# Patient Record
Sex: MALE | Race: AMERICAN INDIAN OR ALASKA NATIVE | Employment: UNEMPLOYED | URBAN - METROPOLITAN AREA
[De-identification: names, ages, dates, MRNs, and addresses within clinical notes are randomized per-mention and may not be internally consistent; named-entity substitution may affect disease eponyms.]

---

## 2018-05-10 ENCOUNTER — OFFICE VISIT (OUTPATIENT)
Dept: FAMILY MEDICINE CLINIC | Age: 68
End: 2018-05-10
Payer: MEDICARE

## 2018-05-10 VITALS
TEMPERATURE: 97.2 F | OXYGEN SATURATION: 97 % | DIASTOLIC BLOOD PRESSURE: 60 MMHG | BODY MASS INDEX: 39.38 KG/M2 | WEIGHT: 214 LBS | HEART RATE: 73 BPM | SYSTOLIC BLOOD PRESSURE: 120 MMHG | RESPIRATION RATE: 12 BRPM | HEIGHT: 62 IN

## 2018-05-10 DIAGNOSIS — L23.7 CONTACT DERMATITIS DUE TO POISON SUMAC: Primary | ICD-10-CM

## 2018-05-10 PROCEDURE — 3017F COLORECTAL CA SCREEN DOC REV: CPT | Performed by: NURSE PRACTITIONER

## 2018-05-10 PROCEDURE — 99213 OFFICE O/P EST LOW 20 MIN: CPT | Performed by: NURSE PRACTITIONER

## 2018-05-10 PROCEDURE — G8417 CALC BMI ABV UP PARAM F/U: HCPCS | Performed by: NURSE PRACTITIONER

## 2018-05-10 PROCEDURE — G8427 DOCREV CUR MEDS BY ELIG CLIN: HCPCS | Performed by: NURSE PRACTITIONER

## 2018-05-10 PROCEDURE — 1123F ACP DISCUSS/DSCN MKR DOCD: CPT | Performed by: NURSE PRACTITIONER

## 2018-05-10 PROCEDURE — 4040F PNEUMOC VAC/ADMIN/RCVD: CPT | Performed by: NURSE PRACTITIONER

## 2018-05-10 PROCEDURE — 1036F TOBACCO NON-USER: CPT | Performed by: NURSE PRACTITIONER

## 2018-05-10 RX ORDER — BETAMETHASONE DIPROPIONATE 0.05 %
OINTMENT (GRAM) TOPICAL
Qty: 1 TUBE | Refills: 0 | Status: SHIPPED | OUTPATIENT
Start: 2018-05-10 | End: 2018-05-16 | Stop reason: ALTCHOICE

## 2018-05-10 RX ORDER — TRIAMCINOLONE ACETONIDE 40 MG/ML
40 INJECTION, SUSPENSION INTRA-ARTICULAR; INTRAMUSCULAR ONCE
Status: COMPLETED | OUTPATIENT
Start: 2018-05-10 | End: 2018-05-10

## 2018-05-10 RX ORDER — TRAMADOL HYDROCHLORIDE 50 MG/1
TABLET ORAL
Refills: 0 | COMMUNITY
Start: 2018-01-31

## 2018-05-10 RX ADMIN — TRIAMCINOLONE ACETONIDE 40 MG: 40 INJECTION, SUSPENSION INTRA-ARTICULAR; INTRAMUSCULAR at 14:31

## 2018-05-10 ASSESSMENT — ENCOUNTER SYMPTOMS
CHEST TIGHTNESS: 0
CONSTIPATION: 0
EYE DISCHARGE: 0
ABDOMINAL PAIN: 0
SHORTNESS OF BREATH: 0
COUGH: 0
DIARRHEA: 0
VOMITING: 0
WHEEZING: 0
SORE THROAT: 0
NAUSEA: 0
RHINORRHEA: 0

## 2018-05-10 ASSESSMENT — PATIENT HEALTH QUESTIONNAIRE - PHQ9
SUM OF ALL RESPONSES TO PHQ9 QUESTIONS 1 & 2: 0
SUM OF ALL RESPONSES TO PHQ QUESTIONS 1-9: 0
1. LITTLE INTEREST OR PLEASURE IN DOING THINGS: 0
2. FEELING DOWN, DEPRESSED OR HOPELESS: 0

## 2018-05-13 DIAGNOSIS — L23.7 CONTACT DERMATITIS DUE TO POISON SUMAC: ICD-10-CM

## 2018-05-13 RX ORDER — BETAMETHASONE DIPROPIONATE 0.5 MG/G
CREAM TOPICAL
Qty: 1 TUBE | Refills: 0 | Status: SHIPPED | OUTPATIENT
Start: 2018-05-13 | End: 2018-05-16 | Stop reason: ALTCHOICE

## 2018-05-16 ENCOUNTER — OFFICE VISIT (OUTPATIENT)
Dept: FAMILY MEDICINE CLINIC | Age: 68
End: 2018-05-16
Payer: MEDICARE

## 2018-05-16 VITALS
RESPIRATION RATE: 12 BRPM | OXYGEN SATURATION: 92 % | WEIGHT: 212.38 LBS | HEART RATE: 65 BPM | TEMPERATURE: 97.7 F | BODY MASS INDEX: 39.08 KG/M2 | DIASTOLIC BLOOD PRESSURE: 80 MMHG | SYSTOLIC BLOOD PRESSURE: 138 MMHG | HEIGHT: 62 IN

## 2018-05-16 DIAGNOSIS — L23.7 CONTACT DERMATITIS DUE TO POISON SUMAC: Primary | ICD-10-CM

## 2018-05-16 DIAGNOSIS — L23.7 POISON SUMAC: ICD-10-CM

## 2018-05-16 PROBLEM — E78.5 HYPERLIPIDEMIA: Status: ACTIVE | Noted: 2018-05-16

## 2018-05-16 PROCEDURE — 3017F COLORECTAL CA SCREEN DOC REV: CPT | Performed by: NURSE PRACTITIONER

## 2018-05-16 PROCEDURE — 4040F PNEUMOC VAC/ADMIN/RCVD: CPT | Performed by: NURSE PRACTITIONER

## 2018-05-16 PROCEDURE — 1123F ACP DISCUSS/DSCN MKR DOCD: CPT | Performed by: NURSE PRACTITIONER

## 2018-05-16 PROCEDURE — 1036F TOBACCO NON-USER: CPT | Performed by: NURSE PRACTITIONER

## 2018-05-16 PROCEDURE — G8417 CALC BMI ABV UP PARAM F/U: HCPCS | Performed by: NURSE PRACTITIONER

## 2018-05-16 PROCEDURE — 99213 OFFICE O/P EST LOW 20 MIN: CPT | Performed by: NURSE PRACTITIONER

## 2018-05-16 PROCEDURE — G8427 DOCREV CUR MEDS BY ELIG CLIN: HCPCS | Performed by: NURSE PRACTITIONER

## 2018-05-16 RX ORDER — TRIAMCINOLONE ACETONIDE 40 MG/ML
40 INJECTION, SUSPENSION INTRA-ARTICULAR; INTRAMUSCULAR ONCE
Status: COMPLETED | OUTPATIENT
Start: 2018-05-16 | End: 2018-05-16

## 2018-05-16 RX ORDER — BETAMETHASONE DIPROPIONATE 0.05 %
OINTMENT (GRAM) TOPICAL
Qty: 1 TUBE | Refills: 0 | Status: SHIPPED | OUTPATIENT
Start: 2018-05-16 | End: 2018-06-07 | Stop reason: SDUPTHER

## 2018-05-16 RX ADMIN — TRIAMCINOLONE ACETONIDE 40 MG: 40 INJECTION, SUSPENSION INTRA-ARTICULAR; INTRAMUSCULAR at 19:58

## 2018-05-16 ASSESSMENT — ENCOUNTER SYMPTOMS
RHINORRHEA: 0
TROUBLE SWALLOWING: 0
CHEST TIGHTNESS: 0
NAUSEA: 0
COUGH: 0
SHORTNESS OF BREATH: 0
VOMITING: 0
CONSTIPATION: 0
EYE ITCHING: 0
ABDOMINAL PAIN: 0
EYE DISCHARGE: 0
STRIDOR: 0
DIARRHEA: 0
WHEEZING: 0

## 2018-06-07 ENCOUNTER — OFFICE VISIT (OUTPATIENT)
Dept: FAMILY MEDICINE CLINIC | Age: 68
End: 2018-06-07
Payer: MEDICARE

## 2018-06-07 VITALS
RESPIRATION RATE: 12 BRPM | SYSTOLIC BLOOD PRESSURE: 130 MMHG | HEIGHT: 62 IN | OXYGEN SATURATION: 97 % | WEIGHT: 207 LBS | TEMPERATURE: 97.8 F | DIASTOLIC BLOOD PRESSURE: 72 MMHG | BODY MASS INDEX: 38.09 KG/M2 | HEART RATE: 77 BPM

## 2018-06-07 DIAGNOSIS — L23.7 CONTACT DERMATITIS DUE TO POISON SUMAC: ICD-10-CM

## 2018-06-07 PROCEDURE — 3017F COLORECTAL CA SCREEN DOC REV: CPT | Performed by: NURSE PRACTITIONER

## 2018-06-07 PROCEDURE — 1123F ACP DISCUSS/DSCN MKR DOCD: CPT | Performed by: NURSE PRACTITIONER

## 2018-06-07 PROCEDURE — G8417 CALC BMI ABV UP PARAM F/U: HCPCS | Performed by: NURSE PRACTITIONER

## 2018-06-07 PROCEDURE — 4040F PNEUMOC VAC/ADMIN/RCVD: CPT | Performed by: NURSE PRACTITIONER

## 2018-06-07 PROCEDURE — G8427 DOCREV CUR MEDS BY ELIG CLIN: HCPCS | Performed by: NURSE PRACTITIONER

## 2018-06-07 PROCEDURE — 99213 OFFICE O/P EST LOW 20 MIN: CPT | Performed by: NURSE PRACTITIONER

## 2018-06-07 PROCEDURE — 1036F TOBACCO NON-USER: CPT | Performed by: NURSE PRACTITIONER

## 2018-06-07 RX ORDER — BETAMETHASONE DIPROPIONATE 0.05 %
OINTMENT (GRAM) TOPICAL
Qty: 1 TUBE | Refills: 0 | Status: SHIPPED | OUTPATIENT
Start: 2018-06-07

## 2018-06-07 RX ORDER — METHYLPREDNISOLONE 4 MG/1
TABLET ORAL
Qty: 1 KIT | Refills: 0 | Status: SHIPPED | OUTPATIENT
Start: 2018-06-07

## 2018-06-07 ASSESSMENT — ENCOUNTER SYMPTOMS
STRIDOR: 0
VOMITING: 0
RHINORRHEA: 0
EYE DISCHARGE: 0
DIARRHEA: 0
ABDOMINAL PAIN: 0
COUGH: 0
CONSTIPATION: 0
SORE THROAT: 0
TROUBLE SWALLOWING: 0
EYE ITCHING: 0
SHORTNESS OF BREATH: 0
NAUSEA: 0
WHEEZING: 0
CHEST TIGHTNESS: 0

## 2023-08-26 PROBLEM — Z89.512: Status: ACTIVE | Noted: 2023-08-26

## 2023-08-26 PROBLEM — G47.33 OBSTRUCTIVE SLEEP APNEA: Status: ACTIVE | Noted: 2023-08-26

## 2023-08-26 PROBLEM — I25.10 CORONARY ARTERY DISEASE WITHOUT ANGINA PECTORIS: Status: ACTIVE | Noted: 2023-08-26

## 2023-08-26 PROBLEM — E66.9 OBESITY: Status: ACTIVE | Noted: 2023-08-26

## 2023-08-26 PROBLEM — R06.02 SHORTNESS OF BREATH: Status: ACTIVE | Noted: 2023-08-26

## 2023-08-26 PROBLEM — I20.9 ANGINA PECTORIS (CMS-HCC): Status: ACTIVE | Noted: 2023-08-26

## 2023-08-26 PROBLEM — M54.2 CERVICAL PAIN: Status: ACTIVE | Noted: 2023-08-26

## 2023-08-26 PROBLEM — R13.10 DYSPHAGIA: Status: ACTIVE | Noted: 2023-08-26

## 2023-08-26 PROBLEM — I10 BENIGN ESSENTIAL HYPERTENSION: Status: ACTIVE | Noted: 2023-08-26

## 2023-08-26 PROBLEM — M54.50 LOW BACK PAIN: Status: ACTIVE | Noted: 2023-08-26

## 2023-08-26 PROBLEM — R53.83 FATIGUE: Status: ACTIVE | Noted: 2023-08-26

## 2023-08-26 PROBLEM — E78.2 MIXED HYPERLIPIDEMIA: Status: ACTIVE | Noted: 2023-08-26

## 2023-08-26 PROBLEM — M47.12 CERVICAL SPONDYLOSIS WITH MYELOPATHY: Status: ACTIVE | Noted: 2023-08-26

## 2023-08-26 PROBLEM — R07.9 CHEST PAIN: Status: ACTIVE | Noted: 2023-08-26

## 2023-08-26 RX ORDER — NITROGLYCERIN 0.4 MG/1
0.4 TABLET SUBLINGUAL
COMMUNITY
Start: 2017-10-02

## 2023-08-26 RX ORDER — LISINOPRIL 10 MG/1
10 TABLET ORAL 2 TIMES DAILY
COMMUNITY
Start: 2017-08-16 | End: 2023-11-29 | Stop reason: ALTCHOICE

## 2023-08-26 RX ORDER — TRAMADOL HYDROCHLORIDE 50 MG/1
50 TABLET ORAL EVERY 8 HOURS PRN
COMMUNITY
Start: 2018-01-31

## 2023-08-26 RX ORDER — BISOPROLOL FUMARATE AND HYDROCHLOROTHIAZIDE 10; 6.25 MG/1; MG/1
1 TABLET ORAL DAILY
COMMUNITY
Start: 2018-09-24

## 2023-08-26 RX ORDER — HYDROGEN PEROXIDE 3 %
20 SOLUTION, NON-ORAL MISCELLANEOUS DAILY
COMMUNITY
End: 2023-11-29 | Stop reason: ALTCHOICE

## 2023-08-26 RX ORDER — ACETAMINOPHEN 500 MG
500 TABLET ORAL EVERY 6 HOURS PRN
COMMUNITY
End: 2023-11-29 | Stop reason: ALTCHOICE

## 2023-08-26 RX ORDER — ASPIRIN 81 MG/1
81 TABLET ORAL DAILY
COMMUNITY
Start: 2018-09-24

## 2023-08-26 RX ORDER — TAMSULOSIN HYDROCHLORIDE 0.4 MG/1
0.4 CAPSULE ORAL DAILY
COMMUNITY
Start: 2018-08-24

## 2023-08-26 RX ORDER — ATORVASTATIN CALCIUM 40 MG/1
1 TABLET, FILM COATED ORAL NIGHTLY
COMMUNITY
Start: 2017-09-21 | End: 2023-11-27

## 2023-08-26 RX ORDER — HYDROXYZINE HYDROCHLORIDE 25 MG/1
25 TABLET, FILM COATED ORAL
COMMUNITY
Start: 2021-08-03

## 2023-08-26 RX ORDER — BUDESONIDE 180 UG/1
AEROSOL, POWDER RESPIRATORY (INHALATION)
COMMUNITY
Start: 2018-08-24 | End: 2023-11-29 | Stop reason: ALTCHOICE

## 2023-10-04 ENCOUNTER — APPOINTMENT (OUTPATIENT)
Dept: CARDIOLOGY | Facility: CLINIC | Age: 73
End: 2023-10-04
Payer: MEDICARE

## 2023-11-23 DIAGNOSIS — E78.2 MIXED HYPERLIPIDEMIA: ICD-10-CM

## 2023-11-27 RX ORDER — ATORVASTATIN CALCIUM 40 MG/1
40 TABLET, FILM COATED ORAL NIGHTLY
Qty: 90 TABLET | Refills: 3 | Status: SHIPPED | OUTPATIENT
Start: 2023-11-27

## 2023-11-29 ENCOUNTER — OFFICE VISIT (OUTPATIENT)
Dept: CARDIOLOGY | Facility: CLINIC | Age: 73
End: 2023-11-29
Payer: MEDICARE

## 2023-11-29 ENCOUNTER — LAB (OUTPATIENT)
Dept: LAB | Facility: LAB | Age: 73
End: 2023-11-29
Payer: MEDICARE

## 2023-11-29 VITALS
WEIGHT: 202.5 LBS | DIASTOLIC BLOOD PRESSURE: 78 MMHG | BODY MASS INDEX: 37.26 KG/M2 | HEART RATE: 57 BPM | HEIGHT: 62 IN | SYSTOLIC BLOOD PRESSURE: 128 MMHG

## 2023-11-29 DIAGNOSIS — I87.2 CHRONIC VENOUS INSUFFICIENCY OF LOWER EXTREMITY: ICD-10-CM

## 2023-11-29 DIAGNOSIS — Z87.891 FORMER SMOKER: ICD-10-CM

## 2023-11-29 DIAGNOSIS — G47.33 OBSTRUCTIVE SLEEP APNEA: ICD-10-CM

## 2023-11-29 DIAGNOSIS — I25.10 CORONARY ARTERY DISEASE WITHOUT ANGINA PECTORIS, UNSPECIFIED VESSEL OR LESION TYPE, UNSPECIFIED WHETHER NATIVE OR TRANSPLANTED HEART: ICD-10-CM

## 2023-11-29 DIAGNOSIS — E78.2 MIXED HYPERLIPIDEMIA: ICD-10-CM

## 2023-11-29 DIAGNOSIS — I10 BENIGN ESSENTIAL HYPERTENSION: ICD-10-CM

## 2023-11-29 LAB
ALBUMIN SERPL BCP-MCNC: 4 G/DL (ref 3.4–5)
ALP SERPL-CCNC: 79 U/L (ref 33–136)
ALT SERPL W P-5'-P-CCNC: 33 U/L (ref 10–52)
ANION GAP SERPL CALC-SCNC: 9 MMOL/L (ref 10–20)
AST SERPL W P-5'-P-CCNC: 29 U/L (ref 9–39)
BILIRUB SERPL-MCNC: 0.9 MG/DL (ref 0–1.2)
BUN SERPL-MCNC: 16 MG/DL (ref 6–23)
CALCIUM SERPL-MCNC: 9 MG/DL (ref 8.6–10.3)
CHLORIDE SERPL-SCNC: 97 MMOL/L (ref 98–107)
CHOLEST SERPL-MCNC: 121 MG/DL (ref 0–199)
CHOLESTEROL/HDL RATIO: 3.2
CO2 SERPL-SCNC: 33 MMOL/L (ref 21–32)
CREAT SERPL-MCNC: 0.72 MG/DL (ref 0.5–1.3)
GFR SERPL CREATININE-BSD FRML MDRD: >90 ML/MIN/1.73M*2
GLUCOSE SERPL-MCNC: 100 MG/DL (ref 74–99)
HDLC SERPL-MCNC: 37.8 MG/DL
LDLC SERPL CALC-MCNC: 60 MG/DL
NON HDL CHOLESTEROL: 83 MG/DL (ref 0–149)
POTASSIUM SERPL-SCNC: 4.5 MMOL/L (ref 3.5–5.3)
PROT SERPL-MCNC: 6.1 G/DL (ref 6.4–8.2)
SODIUM SERPL-SCNC: 134 MMOL/L (ref 136–145)
TRIGL SERPL-MCNC: 117 MG/DL (ref 0–149)
VLDL: 23 MG/DL (ref 0–40)

## 2023-11-29 PROCEDURE — 80061 LIPID PANEL: CPT

## 2023-11-29 PROCEDURE — 80053 COMPREHEN METABOLIC PANEL: CPT

## 2023-11-29 PROCEDURE — 3078F DIAST BP <80 MM HG: CPT | Performed by: INTERNAL MEDICINE

## 2023-11-29 PROCEDURE — 3008F BODY MASS INDEX DOCD: CPT | Performed by: INTERNAL MEDICINE

## 2023-11-29 PROCEDURE — 1159F MED LIST DOCD IN RCRD: CPT | Performed by: INTERNAL MEDICINE

## 2023-11-29 PROCEDURE — 36415 COLL VENOUS BLD VENIPUNCTURE: CPT

## 2023-11-29 PROCEDURE — 3074F SYST BP LT 130 MM HG: CPT | Performed by: INTERNAL MEDICINE

## 2023-11-29 PROCEDURE — 99214 OFFICE O/P EST MOD 30 MIN: CPT | Performed by: INTERNAL MEDICINE

## 2023-11-29 PROCEDURE — 1036F TOBACCO NON-USER: CPT | Performed by: INTERNAL MEDICINE

## 2023-11-29 RX ORDER — LISINOPRIL 20 MG/1
20 TABLET ORAL 2 TIMES DAILY
COMMUNITY
End: 2024-03-25 | Stop reason: SDUPTHER

## 2023-11-29 NOTE — PATIENT INSTRUCTIONS
Follow up in 1 year  Continue same medications and treatments.   Patient educated on proper medication use.   Patient educated on risk factor modification.   Please bring any lab results from other providers / physicians to your next appointment.     Please bring all medicines, vitamins, and herbal supplements with you when you come to the office.     Prescriptions will not be filled unless you are compliant with your follow up appointments or have a follow up appointment scheduled as per instruction of your physician. Refills should be requested at the time of your visit.     Lalita BARON LPN, am scribing for and in the presence of Dr. Naveed Lanier

## 2023-11-29 NOTE — PROGRESS NOTES
CARDIOLOGY OFFICE VISIT      CHIEF COMPLAINT      HISTORY OF PRESENT ILLNESS  The patient states that he has been doing well from a cardiac standpoint.  He denies chest discomfort or symptoms of myocardial ischemia.  He has not used nitroglycerin.  He denies any significant dyspnea.  He denies palpitation and syncope.  He denies any problem with his current cardiac medications.  He has not had lab work done for some time.  I told him we will get lab work done today and call him with results.      IMPRESSION:   1. Coronary artery disease  2. Essential hypertension.  3. Mixed hyperlipidemia.  4. Former smoker.  5. Obesity.   6. Remote left below-the-knee amputation.  7. History of obstructive sleep apnea.  8. Chronic venous insufficiency of right lower extremity.     Please excuse any errors in grammar or translation related to this dictation. Voice recognition software was utilized to prepare this document.       Past Medical History  Past Medical History:   Diagnosis Date    Anesthesia of skin 10/04/2021    Numbness and tingling    Personal history of other diseases of the circulatory system 10/04/2021    History of hypertension    Personal history of other diseases of the musculoskeletal system and connective tissue 10/04/2021    History of arthritis    Personal history of other endocrine, nutritional and metabolic disease     History of high cholesterol    Personal history of other mental and behavioral disorders     History of anxiety    Unspecified visual loss 10/04/2021    Vision problems       Social History  Social History     Tobacco Use    Smoking status: Not on file    Smokeless tobacco: Not on file   Substance Use Topics    Alcohol use: Not on file    Drug use: Not on file       Family History     Family History   Problem Relation Name Age of Onset    Bilateral breast cancer Mother      Other (biliary tract cancer [Other]) Mother      Other (Coronary artery stent placement) Mother      Other (cabg) Father       Other (cardiac disorder) Father      Skin cancer Father          Allergies:  No Known Allergies     Outpatient Medications:  Current Outpatient Medications   Medication Instructions    acetaminophen (TYLENOL) 500 mg, oral, Every 6 hours PRN    aspirin 81 mg, oral, Daily    atorvastatin (LIPITOR) 40 mg, oral, Nightly    bisoproloL-hydrochlorothiazide (Ziac) 10-6.25 mg tablet 1 tablet, oral, Daily    budesonide (Pulmicort Flexhaler) 180 mcg/actuation inhaler inhalation    esomeprazole (NEXIUM) 20 mg, oral, Daily    hydrOXYzine HCL (ATARAX) 25 mg, oral    lisinopril 10 mg, oral, 2 times daily    nitroglycerin (NITROSTAT) 0.4 mg, sublingual    tamsulosin (FLOMAX) 0.4 mg, oral, Daily    traMADol (ULTRAM) 50 mg, oral, Every 8 hours PRN          REVIEW OF SYSTEMS  Review of Systems   All other systems reviewed and are negative.        VITALS  There were no vitals filed for this visit.    PHYSICAL EXAM  Vitals reviewed.   Constitutional:       Appearance: Normal and healthy appearance. Well-developed and not in distress.   Eyes:      Conjunctiva/sclera: Conjunctivae normal.      Pupils: Pupils are equal, round, and reactive to light.   Neck:      Vascular: No JVR. JVD normal.   Pulmonary:      Effort: Pulmonary effort is normal.      Breath sounds: Normal breath sounds. No wheezing. No rhonchi. No rales.   Chest:      Chest wall: Not tender to palpatation.   Cardiovascular:      PMI at left midclavicular line. Normal rate. Regular rhythm. Normal S1. Normal S2.       Murmurs: There is no murmur.      No gallop.  No click. No rub.   Pulses:     Intact distal pulses.   Edema:     Peripheral edema absent.   Abdominal:      Tenderness: There is no abdominal tenderness.   Musculoskeletal: Normal range of motion.         General: No tenderness.      Cervical back: Normal range of motion. Skin:     General: Skin is warm and dry.   Neurological:      General: No focal deficit present.      Mental Status: Alert and oriented to  person, place and time.   Psychiatric:         Behavior: Behavior is cooperative.           ASSESSMENT AND PLAN  Diagnoses and all orders for this visit:  Coronary artery disease without angina pectoris, unspecified vessel or lesion type, unspecified whether native or transplanted heart  Mixed hyperlipidemia  Benign essential hypertension  Chronic venous insufficiency of lower extremity  Obstructive sleep apnea  Former smoker  BMI 37.0-37.9, adult      [unfilled]

## 2023-12-15 ENCOUNTER — TELEPHONE (OUTPATIENT)
Dept: CARDIOLOGY | Facility: CLINIC | Age: 73
End: 2023-12-15
Payer: MEDICARE

## 2023-12-15 NOTE — TELEPHONE ENCOUNTER
----- Message from Miracle Aparicio LPN sent at 11/29/2023  2:24 PM EST -----    ----- Message -----  From: Naveed Lanier MD  Sent: 11/29/2023   1:05 PM EST  To: Miracle Aparicio LPN    Cholesterol good, Chem-12 normal  ----- Message -----  From: Lab, Background User  Sent: 11/29/2023  12:53 PM EST  To: Naveed Lanier MD

## 2024-03-25 DIAGNOSIS — I10 BENIGN ESSENTIAL HYPERTENSION: ICD-10-CM

## 2024-03-25 RX ORDER — LISINOPRIL 20 MG/1
20 TABLET ORAL 2 TIMES DAILY
Qty: 90 TABLET | Refills: 2 | Status: SHIPPED | OUTPATIENT
Start: 2024-03-25 | End: 2024-03-25

## 2024-03-25 RX ORDER — LISINOPRIL 20 MG/1
20 TABLET ORAL 2 TIMES DAILY
Qty: 180 TABLET | Refills: 3 | Status: SHIPPED | OUTPATIENT
Start: 2024-03-25

## 2024-04-12 ENCOUNTER — HOSPITAL ENCOUNTER (OUTPATIENT)
Dept: PHYSICAL THERAPY | Age: 74
Setting detail: THERAPIES SERIES
Discharge: HOME | End: 2024-04-12
Payer: MEDICARE

## 2024-04-12 PROCEDURE — 97162 PT EVAL MOD COMPLEX 30 MIN: CPT

## 2024-04-12 PROCEDURE — 97110 THERAPEUTIC EXERCISES: CPT

## 2024-04-12 NOTE — PROGRESS NOTES
Physical Therapy: Initial Evaluation    Patient: Paco Martinez (74 y.o. male)   Examination Date: 2024  Plan of Care Certification Period: 2024 to 05/10/24      :  1950 ;    Confirmed: Yes MRN: [id number]  CSN: [id number]   Insurance: Payor: [facility] MEDICARE / Plan: [facility] MEDICARE ADVANTAGE / Product Type: *No Product type* /   Insurance ID: [id number] - (Medicare Managed) Secondary Insurance (if applicable):    Referring Physician: Dahlia Eastman CRNP Gross   PCP: Cuca Hannah MD Visits to Date/Visits Approved:     No Show/Cancelled Appts: 0  0     Medical Diagnosis: Lumbar back pain [M54.50] Lumbar pain  Treatment Diagnosis: Lumbar pain     PERTINENT MEDICAL HISTORY      Self reported health status:: Good    Medical History: Chart Reviewed: Yes No past medical history on file.  Surgical History: No past surgical history on file.    Medications:   Current Outpatient Medications:     methylPREDNISolone (MEDROL, МАРИЯ,) 4 MG tablet, Take by mouth., Disp: 1 kit, Rfl: 0    betamethasone dipropionate (DIPROLENE) 0.05 % ointment, Apply topically daily., Disp: 1 Tube, Rfl: 0    traMADol (ULTRAM) 50 MG tablet, TK 1 T PO Q 8 H PRN, Disp: , Rfl: 0    multivitamin (THERAGRAN) per tablet, Take 1 tablet by mouth daily.  , Disp: , Rfl:     FISH OIL, by Does not apply route.  , Disp: , Rfl:     Aspirin Buf,YjUcp-MjRov-IaVyx, (ASCRIPTIN) 325 MG TABS, Take 0.5 tablets by mouth.  , Disp: , Rfl:     atorvastatin (LIPITOR) 40 MG tablet, Take 40 mg by mouth daily.  , Disp: , Rfl:     lisinopril (PRINIVIL;ZESTRIL) 10 MG tablet, Take 10 mg by mouth daily.  , Disp: , Rfl:     bisoprolol-hydrochlorothiazide (ZIAC) 10-6.25 MG per tablet, Take 1 tablet by mouth daily.  , Disp: , Rfl:     gemfibrozil (LOPID) 600 MG tablet, Take 600 mg by mouth 2 times daily (before meals).  , Disp: , Rfl:   Allergies: Patient has no known allergies.      SUBJECTIVE EXAMINATION     History obtained from:: Patient,

## 2024-04-16 ENCOUNTER — HOSPITAL ENCOUNTER (OUTPATIENT)
Dept: PHYSICAL THERAPY | Age: 74
Setting detail: THERAPIES SERIES
Discharge: HOME | End: 2024-04-16
Payer: MEDICARE

## 2024-04-16 PROCEDURE — 97140 MANUAL THERAPY 1/> REGIONS: CPT

## 2024-04-16 PROCEDURE — 97110 THERAPEUTIC EXERCISES: CPT

## 2024-04-16 NOTE — PROGRESS NOTES
stiffness worse in the mornings. Reviewed HEP added stretches including supine hip flexor stretch and LTR, also initiated core strengening including pelvic tilts bridges and SLR wtih abd bracing, Vcs for technique, tolerated addition of exs well without pain during exs. Pain 0/10 post session with reports of less stiffness following manual therapy and stretching exs  Body Structures, Functions, Activity Limitations Requiring Skilled Therapeutic Intervention: Decreased functional mobility , Decreased endurance, Increased pain    Post-Treatment Pain Level:      No data recorded  Therapy Prognosis: Good       GOALS   Patient Goals : To help to decrease his back pain so that he can be more active  Short Term Goals Completed by 1-2 weeks from date of evaluation Current Status Goal Status   Pt reports having 3/10  or less back pain with increased activity       Pt reports able to complete his HEP 3-5x per week                                                                           Long Term Goals Completed by 4-6 weeks from date of evaluation Current Status Goal Status   Pt reports having 1-2/10 back pain for 75% of his day or better so that pt can be more active       Increase pts B LE and core strength to 4+/5 or better so that pt can be on his feet for longer periods of time       Pt able to ambulate with his st cane for >440'x1 with fairly equal step length with less reports of back pain       Improve pts Oswestry Score from 62% disability score to <25% by time of DC       Pt indep with an advanced HEP to progress with pts mobility and decrease his pain                                                    TREATMENT PLAN   Plan Frequency: 1-2  Plan weeks: 4-6  Current Treatment Recommendations: Strengthening, Functional mobility training, Manual, Gait training, Endurance training, Pain management, Home exercise program, Modalities  Modalities: Heat/Cold, Mechanical Traction, Ultrasound, E-stim - unattended

## 2024-04-18 ENCOUNTER — HOSPITAL ENCOUNTER (OUTPATIENT)
Dept: PHYSICAL THERAPY | Age: 74
Setting detail: THERAPIES SERIES
Discharge: HOME | End: 2024-04-18
Payer: MEDICARE

## 2024-04-18 PROCEDURE — 97140 MANUAL THERAPY 1/> REGIONS: CPT

## 2024-04-18 PROCEDURE — 97110 THERAPEUTIC EXERCISES: CPT

## 2024-04-18 NOTE — PROGRESS NOTES
Physical Therapy  Daily Treatment Note  Date: 2024  Patient Name: Paco Martinez  MRN: [id number]     :   1950    Referring Physician: Dahlia Eastman APRN - * Jaren   PCP: Cuca Hannah MD    Medical Diagnosis: Lumbar back pain [M54.50]    Treatment Diagnosis: Lumbar pain      Insurance: Payor: [facility] MEDICARE / Plan: [facility] MEDICARE ADVANTAGE / Product Type: *No Product type* /   Insurance ID: [id number] - (Medicare Managed)    Subjective:   General  Referring Provider (secondary): Gross  PT Visit Information  Total # of Visits Approved: 12  Total # of Visits to Date: 3  Plan of Care/Certification Expiration Date: 05/10/24  No Show: 0  Canceled Appointment: 0  Referring Provider (secondary): Jaren  Subjective  Subjective: Pt. arrives to therapy with no pain currently.  Pt. reports he did wake up with pain and reports he had to take a Yuly Aspirin.  Usually uses crutches first thing this morning.  Pain Screening  Patient Currently in Pain: No       Treatment Activities:  Exercises:      Treatment Reasoning    Exercise 1: *SKTC x 3 H30  Exercise 2: *Piriformis x 3 H30  Exercise 3: Supine HS stretch hold 30 sec x 3  Exercise 4: pelvic tilt x 10 H 3-5  Exercise 5: mini bridge x 10 (no hold due to cramping R LE when attempting to hold  Exercise 6: supine hip flexor stretch H 30 sec x 3 R and L  Exercise 8: butterfly stretch x 3 hold 30 sec                          Manual Therapy: (CPT 71230) Treatment Reasoning     Joint Mobilization: PA mobs lumbar spine gr I-III for pain reduction and to improve joint mobility  Soft Tissue Mobilizaton: STM/MFR lumbar paraspinals to address stiffness/tightness, Limitations addressed: Joint motion, Tissue extensibility                     Assessment:   Conditions Requiring Skilled Therapeutic Intervention  Body Structures, Functions, Activity Limitations Requiring Skilled Therapeutic Intervention: Decreased functional mobility ;Decreased endurance;Increased pain  Assessment:

## 2024-04-23 ENCOUNTER — HOSPITAL ENCOUNTER (OUTPATIENT)
Dept: PHYSICAL THERAPY | Age: 74
Setting detail: THERAPIES SERIES
Discharge: HOME | End: 2024-04-23
Payer: MEDICARE

## 2024-04-23 PROCEDURE — 97110 THERAPEUTIC EXERCISES: CPT

## 2024-04-23 PROCEDURE — 97140 MANUAL THERAPY 1/> REGIONS: CPT

## 2024-04-23 NOTE — PROGRESS NOTES
Physical Therapy: Daily Note   Patient: Paco Martinez (74 y.o. male)   Examination Date: 2024  Plan of Care/Certification Expiration Date: 05/10/24    No data recorded   :  1950 # of Visits since SOC:   Visit count could not be calculated. Make sure you are using a visit which is associated with an episode.   MRN: [id number]  CSN: [id number] Start of Care Date:   No linked episodes   Insurance: Payor: [facility] MEDICARE / Plan: [facility] MEDICARE ADVANTAGE / Product Type: *No Product type* /   Insurance ID: [id number] - (Medicare Managed) Secondary Insurance (if applicable):    Referring Physician: Dahlia Eastman APRN - NP Gross   PCP: Cuca Hannah MD Visits to Date/Visits Approved:     No Show/Cancelled Appts: 0 / 0     Medical Diagnosis: Lumbar back pain [M54.50]    Treatment Diagnosis: Lumbar pain        SUBJECTIVE EXAMINATION   Pain Level: Pain Screening  Patient Currently in Pain: No    Patient Comments: Subjective: Pt. reported that he feels pretty good this am with no pain currently but that can change at any moment. He does use crutches in the am for about 3-4 min to \"get himself going\" and then he feels looser and able to ambulate easier.    HEP Compliance: Good        OBJECTIVE EXAMINATION   Restrictions:  No data recorded No data recorded No data recorded              TREATMENT     Exercises:      Treatment Reasoning    Exercise 1: *SKTC x 3 H30  Exercise 2: *Piriformis x 3 H30  Exercise 3: Supine HS stretch hold 30 sec x 3  Exercise 4: pelvic tilt x 10 H 3-5  Exercise 8: butterfly stretch x 3 hold 30 sec  Exercise 9: LTR 10 sec x 10  Exercise 10: 4-way SLR table x 10 ea  Exercise 11: education on self TPR with tennis ball                          Manual Therapy: (CPT 87157) Treatment Reasoning     Joint Mobilization: PA mobs lumbar spine gr I-III for pain reduction and to improve joint mobility  Soft Tissue Mobilizaton: STM/MFR lumbar paraspinals, QL, ITB, GM, glut, piriformis with and without

## 2024-04-25 ENCOUNTER — HOSPITAL ENCOUNTER (OUTPATIENT)
Dept: PHYSICAL THERAPY | Age: 74
Setting detail: THERAPIES SERIES
Discharge: HOME | End: 2024-04-25
Payer: MEDICARE

## 2024-04-25 PROCEDURE — 97140 MANUAL THERAPY 1/> REGIONS: CPT

## 2024-04-25 PROCEDURE — 97110 THERAPEUTIC EXERCISES: CPT

## 2024-04-25 NOTE — PROGRESS NOTES
Physical Therapy: Daily Note   Patient: Paco Martinez (74 y.o. male)   Examination Date: 2024  Plan of Care/Certification Expiration Date: 05/10/24    No data recorded   :  1950 # of Visits since SOC:   Visit count could not be calculated. Make sure you are using a visit which is associated with an episode.   MRN: [id number]  CSN: [id number] Start of Care Date:   No linked episodes   Insurance: Payor: [facility] MEDICARE / Plan: [facility] MEDICARE ADVANTAGE / Product Type: *No Product type* /   Insurance ID: [id number] - (Medicare Managed) Secondary Insurance (if applicable):    Referring Physician: Dahlia Eastman APRN - NP Gross   PCP: Cuca Hannah MD Visits to Date/Visits Approved: 5 /      No Show/Cancelled Appts: 0 / 0     Medical Diagnosis: Lumbar back pain [M54.50] Lumbar pain  Treatment Diagnosis: Lumbar pain        SUBJECTIVE EXAMINATION   Pain Level:  -3/10    Patient Comments: Subjective: Pt reports rough day yesterday breonna, 6-8/10 most of day, better today achy and stiff 2-3/10    HEP Compliance: Good        OBJECTIVE EXAMINATION       TREATMENT     Exercises:      Treatment Reasoning    Exercise 1: *SKTC x 3 H30  Exercise 2: *Piriformis x 3 H30  Exercise 3: Supine HS stretch hold 30 sec x 3 (sheet around ball of foot)  Exercise 4: pelvic tilt x 10 H 3-5  Exercise 6: supine hip flexor stretch H 30 sec x 3 R and L (instructed to flex L knee slightly for increased stretch in quad)  Exercise 7: SLR with abd bracing x 10 vcs for correct form  Exercise 8: butterfly stretch x 3 hold 20- 30 sec  Exercise 9: LTR 10 sec x 5  Exercise 11: Attempted R sidelying L quad stretch- very tight, major stretch felt with minimal active knee flex in sidelying, attempted sheet for gentle overpressure-unable to tolerate thus deferred    Limitations addressed: Mobility, Strength                     Manual Therapy: (CPT 58793) Treatment Reasoning     Joint Mobilization: PA mobs lumbar spine gr I-III for pain reduction and

## 2024-04-30 ENCOUNTER — HOSPITAL ENCOUNTER (OUTPATIENT)
Dept: PHYSICAL THERAPY | Age: 74
Setting detail: THERAPIES SERIES
Discharge: HOME | End: 2024-04-30
Payer: MEDICARE

## 2024-04-30 PROCEDURE — 97140 MANUAL THERAPY 1/> REGIONS: CPT

## 2024-04-30 PROCEDURE — 97110 THERAPEUTIC EXERCISES: CPT

## 2024-04-30 NOTE — PROGRESS NOTES
Physical Therapy: Daily Note   Patient: Paco Martinez (74 y.o. male)   Examination Date: 2024  Plan of Care/Certification Expiration Date: 05/10/24    No data recorded   :  1950 # of Visits since SOC:   Visit count could not be calculated. Make sure you are using a visit which is associated with an episode.   MRN: [id number]  CSN: [id number] Start of Care Date:   No linked episodes   Insurance: Payor: [facility] MEDICARE / Plan: [facility] MEDICARE ADVANTAGE / Product Type: *No Product type* /   Insurance ID: [id number] - (Medicare Managed) Secondary Insurance (if applicable):    Referring Physician: Dahlia Eastman APRN - NP Gross   PCP: Cuca Hannah MD Visits to Date/Visits Approved:     No Show/Cancelled Appts: 0 / 0     Medical Diagnosis: Lumbar back pain [M54.50]    Treatment Diagnosis: Lumbar pain        SUBJECTIVE EXAMINATION   Pain Level: Pain Screening  Patient Currently in Pain: No    Patient Comments: Subjective: Pt. reported that he was at the [employer] yesterday and the elevator door hit him and he fell into the elevator and landed on his lefthip/buttock and the left elbow. He had a huge area of swelling on the left elbow that he did ice all day and reported that it has gone down in half from yesterday but it is still very swollen. Stiffness in the LB.    HEP Compliance: Good        OBJECTIVE EXAMINATION   Restrictions:  No data recorded No data recorded No data recorded              TREATMENT     Exercises:      Treatment Reasoning    Exercise 1: *SKTC x 3 H30  Exercise 2: *Piriformis x 3 H30  Exercise 3: Supine HS stretch hold 30 sec x 3 (sheet around ball of foot)  Exercise 4: pelvic tilt x 10 H 3-5  Exercise 5: mini bridge x 10 (no hold due to cramping R LE when attempting to hold  Exercise 6: supine hip flexor stretch H 30 sec x 3 R and L (instructed to flex L knee slightly for increased stretch in quad)  Exercise 7: SLR with abd bracing x 10 vcs for correct form  Exercise 8: butterfly stretch x

## 2024-05-02 ENCOUNTER — HOSPITAL ENCOUNTER (OUTPATIENT)
Dept: PHYSICAL THERAPY | Age: 74
Setting detail: THERAPIES SERIES
Discharge: HOME | End: 2024-05-02
Payer: MEDICARE

## 2024-05-02 PROCEDURE — 97140 MANUAL THERAPY 1/> REGIONS: CPT

## 2024-05-02 PROCEDURE — 97110 THERAPEUTIC EXERCISES: CPT

## 2024-05-02 NOTE — PROGRESS NOTES
Physical Therapy: Daily Note   Patient: Paco Martinez (74 y.o. male)   Examination Date: 2024  Plan of Care/Certification Expiration Date: 05/10/24    No data recorded   :  1950 # of Visits since SOC:   Visit count could not be calculated. Make sure you are using a visit which is associated with an episode.   MRN: [id number]  CSN: [id number] Start of Care Date:   No linked episodes   Insurance: Payor: [facility] MEDICARE / Plan: [facility] MEDICARE ADVANTAGE / Product Type: *No Product type* /   Insurance ID: [id number] - (Medicare Managed) Secondary Insurance (if applicable):    Referring Physician: Dahlia Eastman APRN - NP Gross   PCP: Cuca Hannah MD Visits to Date/Visits Approved:     No Show/Cancelled Appts: 0 / 0     Medical Diagnosis: Lumbar back pain [M54.50]    Treatment Diagnosis: Lumbar pain        SUBJECTIVE EXAMINATION   Pain Level: Pain Screening  Patient Currently in Pain: Yes  Pain Assessment: 0-10  Pain Level:  (1/10)    Patient Comments: Subjective: Pt states before bed last night he had inc pain in L buttocks, felt weakness in his knees so he used heating pad. Pt states this morning he felt pretty good but his back was aching so he used the heating pad and stretched. \"Right now it feels pretty good\". Pt states he did take half a Yuly Asprin. Pt states pain is in R groin, R and L buttocks and low back.    HEP Compliance: Good            TREATMENT     Exercises:      Treatment Reasoning    Exercise 1: *SKTC x 3 H30  Exercise 8: butterfly stretch x 3 hold 60 sec  Exercise 10: 4-way SLR table x 10 H5'' ea  Exercise 11: B hip flexor stretch supine H60'' x 2                          Manual Therapy: (CPT 37946) Treatment Reasoning      MFR/STM to lumbar/glutes to dec tightness. PROM to B hips within all available planes to dec tightness and pain.                       Pt Education:         ASSESSMENT     Assessment: Assessment: Performed PROM to B hips to dec tightness and pain. Pt able to

## 2024-05-07 ENCOUNTER — HOSPITAL ENCOUNTER (OUTPATIENT)
Dept: PHYSICAL THERAPY | Age: 74
Setting detail: THERAPIES SERIES
Discharge: HOME | End: 2024-05-07
Payer: MEDICARE

## 2024-05-07 PROCEDURE — 97140 MANUAL THERAPY 1/> REGIONS: CPT

## 2024-05-07 PROCEDURE — 97110 THERAPEUTIC EXERCISES: CPT

## 2024-05-07 NOTE — PROGRESS NOTES
Physical Therapy  Physical Therapy: Daily Note   Patient: Paco Martinez (74 y.o. male)   Examination Date: 2024  Plan of Care/Certification Expiration Date: 05/10/24    No data recorded   :  1950 # of Visits since SOC:   Visit count could not be calculated. Make sure you are using a visit which is associated with an episode.   MRN: [id number]  CSN: [id number] Start of Care Date:   No linked episodes   Insurance: Payor: [facility] MEDICARE / Plan: [facility] MEDICARE ADVANTAGE / Product Type: *No Product type* /   Insurance ID: [id number] - (Medicare Managed) Secondary Insurance (if applicable):    Referring Physician: Dahlia Eastman APRN - NP Gross   PCP: Cuca Hannah MD Visits to Date/Visits Approved:     No Show/Cancelled Appts: 0 / 0     Medical Diagnosis: Lumbar back pain [M54.50] Lumbar pain  Treatment Diagnosis: Lumbar pain        SUBJECTIVE EXAMINATION   Pain Level:      Patient Comments: Subjective: My back hurts right now 4/10 and earlier pain was 7/10. I hurt more on left side of hip/low back. I fell a week ago friday not paying attention shaking another person's hand and elevator door pushed me over. I have been achy and sore, so I took it easy and did not do my HEP.    HEP Compliance: Fair        OBJECTIVE EXAMINATION   Restrictions:  No data recorded No data recorded No data recorded              TREATMENT     Exercises:      Treatment Reasoning    Exercise 1: *SKTC x 3 H30  Exercise 2: *Piriformis x 3 H30  Exercise 3: Supine HS stretch hold 30 sec x 3 (sheet around ball of foot)  Exercise 4: pelvic tilt x 10 H 3-5  Exercise 5: mini bridge x 10 with red bolster under knees for support (no hold due to cramping R LE when attempting to hold  Exercise 6: supine hip flexor stretch H 30 sec x 3 R and L (vc's to flex L knee slightly for increased stretch in quad)  Exercise 8: butterfly stretch x 3 hold 60 sec  Exercise 11: B hip flexor stretch supine H30'' x 3  Exercise 12: Prone hip extension x 10' B

## 2024-05-10 ENCOUNTER — HOSPITAL ENCOUNTER (OUTPATIENT)
Dept: PHYSICAL THERAPY | Age: 74
Setting detail: THERAPIES SERIES
Discharge: HOME | End: 2024-05-10
Payer: MEDICARE

## 2024-05-10 PROCEDURE — 97110 THERAPEUTIC EXERCISES: CPT

## 2024-05-10 PROCEDURE — 97140 MANUAL THERAPY 1/> REGIONS: CPT

## 2024-05-10 NOTE — PROGRESS NOTES
Physical Therapy: Daily Note   Patient: Paco Martinez (74 y.o. male)   Examination Date: 05/10/2024  Plan of Care/Certification Expiration Date: 05/10/24    No data recorded   :  1950 # of Visits since SOC:   Visit count could not be calculated. Make sure you are using a visit which is associated with an episode.   MRN: [id number]  CSN: [id number] Start of Care Date:   No linked episodes   Insurance: Payor: [facility] MEDICARE / Plan: [facility] MEDICARE ADVANTAGE / Product Type: *No Product type* /   Insurance ID: [id number] - (Medicare Managed) Secondary Insurance (if applicable):    Referring Physician: Dahlia Eastman APRN - NP Gross   PCP: Cuca Hannah MD Visits to Date/Visits Approved:     No Show/Cancelled Appts: 0 / 0     Medical Diagnosis: Lumbar back pain [M54.50]    Treatment Diagnosis: Lumbar pain        SUBJECTIVE EXAMINATION   Pain Level: Pain Screening  Patient Currently in Pain: No    Patient Comments: Subjective: Pt states no painat arrival and states stretching in bed before getting up, which he says helps.    HEP Compliance: Good        OBJECTIVE EXAMINATION   Restrictions:  No data recorded No data recorded No data recorded          TREATMENT     Exercises:      Treatment Reasoning    Exercise 1: *SKTC x 3 H30  Exercise 2: *Piriformis x 3 H30  Exercise 3: Supine HS stretch hold 30 sec x 3 (sheet around ball of foot)  Exercise 4: pelvic tilt x 10 H 3-5  Exercise 5: mini bridge x 10 with red bolster under knees for support (no hold due to cramping R LE when attempting to hold  Exercise 6: supine hip flexor stretch H 30 sec x 3 R and L (vc's to flex L knee slightly for increased stretch in quad)  Exercise 8: butterfly stretch x 3 hold 60 sec  Exercise 11: B hip flexor stretch supine H30'' x 3  Exercise 12: Prone hip extension x 10' B LE  Exercise 13: B sidelying Hip abduction with tactile and vc's for form x 10'    Limitations addressed: Mobility, Strength                     Manual Therapy: (CPT

## 2024-05-14 ENCOUNTER — HOSPITAL ENCOUNTER (OUTPATIENT)
Dept: PHYSICAL THERAPY | Age: 74
Setting detail: THERAPIES SERIES
Discharge: HOME | End: 2024-05-14
Payer: MEDICARE

## 2024-05-14 PROCEDURE — 97530 THERAPEUTIC ACTIVITIES: CPT

## 2024-05-14 PROCEDURE — 97140 MANUAL THERAPY 1/> REGIONS: CPT

## 2024-05-14 PROCEDURE — 97110 THERAPEUTIC EXERCISES: CPT

## 2024-05-14 NOTE — PROGRESS NOTES
Physical Therapy: Monthly Recheck   Patient: Paco Martinez (74 y.o. male)   Examination Date: 2024  Plan of Care/Certification Expiration Date: 24    No data recorded   :  1950 # of Visits since SOC:   Visit count could not be calculated. Make sure you are using a visit which is associated with an episode.   MRN: [id number]  CSN: [id number] Start of Care Date:   No linked episodes   Insurance: Payor: [facility] MEDICARE / Plan: [facility] MEDICARE ADVANTAGE / Product Type: *No Product type* /   Insurance ID: [id number] - (Medicare Managed) Secondary Insurance (if applicable):    Referring Physician: Dahlia Eastman APRN - NP Gross   PCP: Cuca Hannah MD Visits to Date/Visits Approved: 10 /  ()    No Show/Cancelled Appts:   /       Medical Diagnosis: Lumbar back pain [M54.50] Lumbar pain  Treatment Diagnosis: Lumbar pain        SUBJECTIVE EXAMINATION   Pain Level:  3    Patient Comments: Subjective: Pt reports he has been walking at [facility] last few days visiting his daughter 25 min walk from car to her room and pain has been aggrevated a little however overall pt reports improvement since initiating therapy. Pain worse L lower back last couple of day. 4-5/10 pain yesterday while walking, has been performing stretching exs in the monring before getting out to bed which alleviate some stiffness. Ave pain 3-4/10 during ADLs last week.    HEP Compliance: Good        OBJECTIVE EXAMINATION       Ambulation/Gait (if applicable):   Ambulation  Surface: Level tile  Device: Single point cane  Other Apparatus:  (L LE prosthesis)  Assistance: Modified Independent  Quality of Gait: pt carrying cane demonstrating upright posture, wide WINNIE, decreased heel strike R LE, slight decreased stance L LE and shorter step length R LE without cane, near equal step length when using cane, pain 4/10 L low back post ambulation  Gait Deviations: Increased WINNIE, Decreased step length  Distance: 500 feet- 440 feet carryign cane last

## 2024-05-16 ENCOUNTER — HOSPITAL ENCOUNTER (OUTPATIENT)
Dept: PHYSICAL THERAPY | Age: 74
Setting detail: THERAPIES SERIES
Discharge: HOME | End: 2024-05-16
Payer: MEDICARE

## 2024-05-16 PROCEDURE — 97140 MANUAL THERAPY 1/> REGIONS: CPT

## 2024-05-16 PROCEDURE — 97110 THERAPEUTIC EXERCISES: CPT

## 2024-05-16 PROCEDURE — 97112 NEUROMUSCULAR REEDUCATION: CPT

## 2024-05-16 NOTE — PROGRESS NOTES
Physical Therapy: Daily Note   Patient: Paco Martinez (74 y.o. male)   Examination Date: 2024  Plan of Care/Certification Expiration Date: 24    No data recorded   :  1950 # of Visits since SOC:   Visit count could not be calculated. Make sure you are using a visit which is associated with an episode.   MRN: [id number]  CSN: [id number] Start of Care Date:   No linked episodes   Insurance: Payor: [facility] MEDICARE / Plan: [facility] MEDICARE ADVANTAGE / Product Type: *No Product type* /   Insurance ID: [id number] - (Medicare Managed) Secondary Insurance (if applicable):    Referring Physician: Dahlia Eastman APRN - NP Gross   PCP: Cuca Hannah MD Visits to Date/Visits Approved:   ()    No Show/Cancelled Appts:   /       Medical Diagnosis: Lumbar back pain [M54.50]    Treatment Diagnosis: Lumbar pain        SUBJECTIVE EXAMINATION   Pain Level: Pain Screening  Patient Currently in Pain: No    Patient Comments: Subjective: Pt states 30 min ago his pain was right in his R glutes, \"that sciatic pain\". Pt states his pain was relieved with positional   change from back to stand.    HEP Compliance: Good            TREATMENT     Exercises:      Treatment Reasoning    Exercise 3: supine HS stretch; H30'' x 3  Exercise 5: mini bridge x 10  Exercise 6: supine hip flexor stretch H 30 sec x 3 R and L (vc's to flex L knee slightly for increased stretch in quad)  Exercise 7: SLR with abd bracing x 10 vcs for correct form H3''  Exercise 9: LTR 30 sec x 3  Exercise 10: sidelying B hip abduction; H3'' x 10  Exercise 11: prone HS curl; RLE YTB H3'' x 10, LLE x 10 H3''  Exercise 12: Prone hip extension x 10' B LE alternating bilat LEs  Exercise 14: standing B shoulder extension H5'' x 15 GTB w/ abdominal bracing                          Manual Therapy: (CPT 41839) Treatment Reasoning      MFR/STM to lumbar thoracic area to dec tightness and pain.                      Pt Education:         ASSESSMENT     Assessment:

## 2024-05-21 ENCOUNTER — HOSPITAL ENCOUNTER (OUTPATIENT)
Dept: PHYSICAL THERAPY | Age: 74
Setting detail: THERAPIES SERIES
Discharge: HOME | End: 2024-05-21
Payer: MEDICARE

## 2024-05-21 PROCEDURE — 97140 MANUAL THERAPY 1/> REGIONS: CPT

## 2024-05-21 PROCEDURE — 97110 THERAPEUTIC EXERCISES: CPT

## 2024-05-21 NOTE — PROGRESS NOTES
Physical Therapy  Daily Treatment Note  Date: 2024  Patient Name: Paco Martinez  MRN: [id number]     :   1950    Referring Physician: Dahlia Eastman APRN - * Jaren   PCP: Cuca Hannah MD    Medical Diagnosis: Lumbar back pain [M54.50]    Treatment Diagnosis: Lumbar pain      Insurance: Payor: [facility] MEDICARE / Plan: [facility] MEDICARE ADVANTAGE / Product Type: *No Product type* /   Insurance ID: [id number] - (Medicare Managed)    Subjective:   General  Referring Provider (secondary): Jaren  PT Visit Information  Onset Date:  (-)  Total # of Visits Approved:  (14)  Total # of Visits to Date: 12  Plan of Care/Certification Expiration Date: 24  No Show: 0  Canceled Appointment: 0  Referring Provider (secondary): Jaren  Subjective  Subjective: Pt. states pain in R glute this morning when waking up and states no pain currently.  Sometimes has to use crutches for first 5-10 min then sometimes stretches before he gets out of bed.  Pain Screening  Patient Currently in Pain: No       Treatment Activities:  Exercises:      Treatment Reasoning    Exercise 1: *SKTC x 3 H30  Exercise 2: *Piriformis x 2 H30  Exercise 3: supine HS stretch; H30'' x 3  Exercise 5: mini bridge 2x 10 hold 3 sec  Exercise 6: supine hip flexor stretch H 30 sec x 3 R and L (vc's to flex L knee slightly for increased stretch in quad)  Exercise 7: SLR with abd bracing 2x 10 vcs for correct form H3''  Exercise 8: butterfly stretch x 3 hold 60 sec  Exercise 9: LTR 30 sec x 3  Exercise 10: sidelying B hip abduction; H3'' x 15  Exercise 11: prone HS curl; RLE YTB H3'' x 10, LLE x 10 H3'' YTB  Exercise 12: Prone hip extension x 15' B LE alternating bilat LEs  Exercise 13: B sidelying Hip abduction with tactile and vc's for form x 15' hold 3  sec    Limitations addressed: Mobility, Strength                     Manual Therapy: (CPT 20188) Treatment Reasoning     Joint Mobilization: PA mobs lumbar spine gr II-III for pain reduction and to improve

## 2024-05-23 ENCOUNTER — HOSPITAL ENCOUNTER (OUTPATIENT)
Dept: PHYSICAL THERAPY | Age: 74
Setting detail: THERAPIES SERIES
Discharge: HOME | End: 2024-05-23
Payer: MEDICARE

## 2024-05-23 PROCEDURE — 97110 THERAPEUTIC EXERCISES: CPT

## 2024-05-23 PROCEDURE — 97116 GAIT TRAINING THERAPY: CPT

## 2024-05-23 NOTE — PROGRESS NOTES
Physical Therapy  Daily Treatment Note  Date: 2024  Patient Name: Paco Martinez  MRN: [id number]     :   1950    Referring Physician: Dahlia Eastman APRN - * Jaren   PCP: Cuca Hannah MD    Medical Diagnosis: Lumbar back pain [M54.50]    Treatment Diagnosis: Lumbar pain      Insurance: Payor: [facility] MEDICARE / Plan: [facility] MEDICARE ADVANTAGE / Product Type: *No Product type* /   Insurance ID: [id number] - (Medicare Managed)    Subjective:   General  Referring Provider (secondary): Jaren  PT Visit Information  Total # of Visits Approved:  (14-18)  Total # of Visits to Date: 13  Plan of Care/Certification Expiration Date: 24  No Show: 0  Canceled Appointment: 0  Referring Provider (secondary): Jaren  Subjective  Subjective: Pt. states he has been waking up sometimes with pain up to 4/10.  The other day standing in the kitchen he got pain in buttock then had to go stretch again.  \"I felt pretty good after that.\"       Treatment Activities:  Exercises:      Treatment Reasoning    Exercise 1: *SKTC x 3 H30  Exercise 2: *Piriformis x 2 H30  Exercise 3: supine HS stretch; H30'' x 3  Exercise 5: mini bridge 2x 10 hold 3 sec  Exercise 6: supine hip flexor stretch H 30 sec x 3 R and L (vc's to flex L knee slightly for increased stretch in quad)  Exercise 8: butterfly stretch x 3 hold 60 sec  Exercise 9: LTR 30 sec x 3  Exercise 10: supported standing hip ABD x 20 alternating cues to slow pace  Exercise 11: supported standing hip Ext x 20 hold 3 sec alternating  Exercise 12: Minisquats x 15  Exercise 14: standing B shoulder extension H5'' x 15 GTB w/ abdominal bracing  Exercise 15: Mid rows GTB x 20 with abd bracing                          Gait: (CPT 37184)  Treatment Reasoning    Gait Training 1: Pt amb 500 feet carying cane decreased R step length denies pain                      Assessment:   Conditions Requiring Skilled Therapeutic Intervention  Body Structures, Functions, Activity Limitations Requiring

## 2024-05-28 ENCOUNTER — APPOINTMENT (OUTPATIENT)
Dept: PHYSICAL THERAPY | Age: 74
End: 2024-05-28
Payer: MEDICARE

## 2024-05-30 ENCOUNTER — HOSPITAL ENCOUNTER (OUTPATIENT)
Dept: PHYSICAL THERAPY | Age: 74
Setting detail: THERAPIES SERIES
Discharge: HOME | End: 2024-05-30
Payer: MEDICARE

## 2024-05-30 PROCEDURE — 97140 MANUAL THERAPY 1/> REGIONS: CPT

## 2024-05-30 PROCEDURE — 97110 THERAPEUTIC EXERCISES: CPT

## 2024-05-30 NOTE — PROGRESS NOTES
Assessment:   Conditions Requiring Skilled Therapeutic Intervention  Body Structures, Functions, Activity Limitations Requiring Skilled Therapeutic Intervention: Decreased functional mobility ;Decreased endurance;Increased pain  Assessment: Pt. arrives with soreness and tightness R glute and hip this date.  Performed STM and MFR to help dec tension and pain.  Pt. was able to tolerate inc reps strneghteing no inc pain.  Less tightness post and denies pain post.  Treatment Diagnosis: Lumbar pain          Goals:  Short Term Goals  Time Frame for Short Term Goals: 1-2 weeks from date of evaluation  Short Term Goal 1: Pt reports having 3/10  or less back pain with increased activity  STG 1 Current Status:: 3-4/10 with increased acitvity 4-5/10 with 25 min walking  STG Goal 1 Status:: Partially met, In progress  Short Term Goal 2: Pt reports able to complete his HEP 3-5x per week  STG 2 Current Status:: Pt reported HEP- at least a few stretches almost daily  STG Goal 2 Status:: Met  Long Term Goals  Time Frame for Long Term Goals : 4-6 weeks from date of evaluation  Long Term Goal 1: Pt reports having 1-2/10 back pain for 75% of his day or better so that pt can be more active  LTG 1 Current Status:: 1-2/10 back pain near 75% of the day, 3-4 with increased activity  LTG Goal 1 Status:: Met  Long Term Goal 2: Increase pts B LE and core strength to 4+/5 or better so that pt can be on his feet for longer periods of time  LTG 2 Current Status:: Strength slowly  improving, goal not yet met  LTG Goal 2 Status:: In progress  Long Term Goal 3: Pt able to ambulate with his st cane for >440'x1 with fairly equal step length with less reports of back pain  LTG 3 Current Status:: Pt amb 500 feet carying cane decreased R  step length denies pain  LTG Goal 3 Status:: In progress  Long Term Goal 4: Improve pts Oswestry Score from 62% disability score to <25% by time of DC  LTG 4 Current Status:: Oswestry day of recheck 5/14= 42%  LTG

## 2024-06-04 ENCOUNTER — HOSPITAL ENCOUNTER (OUTPATIENT)
Dept: PHYSICAL THERAPY | Age: 74
Setting detail: THERAPIES SERIES
Discharge: HOME | End: 2024-06-04
Payer: MEDICARE

## 2024-06-04 PROCEDURE — 97140 MANUAL THERAPY 1/> REGIONS: CPT

## 2024-06-04 PROCEDURE — 97110 THERAPEUTIC EXERCISES: CPT

## 2024-06-04 NOTE — PROGRESS NOTES
Physical Therapy  Daily Treatment Note  Date: 2024  Patient Name: Paco Martinez  MRN: [id number]     :   1950    Referring Physician: Dahlia Eastman APRN - * Jaren   PCP: Cuca Hannah MD    Medical Diagnosis: Lumbar back pain [M54.50]    Treatment Diagnosis: Lumbar pain      Insurance: Payor: [facility] MEDICARE / Plan: [facility] MEDICARE ADVANTAGE / Product Type: *No Product type* /   Insurance ID: [id number] - (Medicare Managed)    Subjective:   General  Referring Provider (secondary): Jaren  PT Visit Information  Onset Date:  ()  Total # of Visits Approved:  ()  Total # of Visits to Date: 15  Plan of Care/Certification Expiration Date: 24  No Show: 0  Canceled Appointment: 0  Referring Provider (secondary): Jaren       Treatment Activities:  Exercises:      Treatment Reasoning    Exercise 1: *SKTC x 3 H30  Exercise 2: *Piriformis x 2 H30  Exercise 3: prone hip ext alt x 10 hold 3 sec  Exercise 4: scissor bridge GTB x 10 VC for form  Exercise 5: bridge 2x 10 hold 3 sec  Exercise 6: supine hip flexor stretch H 30 sec x 3 R  Exercise 7: SLR with abd bracing 2x 10 vcs for correct form H3''  Exercise 8: butterfly stretch x 2 hold 60 sec  Exercise 13: B sidleying clamshells x20 hold 3 sec GTB                          Gait: (CPT 91910)  Treatment Reasoning    Gait Training 1: Pt amb 440 feet carying cane decreased R step length denies pain completed in 3 min and 6 seconds                     Manual Therapy: (CPT 28097) Treatment Reasoning     Joint Mobilization: PA mobs lumbar spine gr II-III for pain reduction and to improve joint mobility  Soft Tissue Mobilizaton: STM/MFR to sacral lumbar region R> L to dec tension and pain                       Assessment:   Conditions Requiring Skilled Therapeutic Intervention  Assessment: Less pain and tightness overall.  Continued with strengthening and gait training focusing on larger step length as able.  STM and mobs to lumbar sacral area to dec pain and

## 2024-06-06 ENCOUNTER — HOSPITAL ENCOUNTER (OUTPATIENT)
Dept: PHYSICAL THERAPY | Age: 74
Setting detail: THERAPIES SERIES
Discharge: HOME | End: 2024-06-06
Payer: MEDICARE

## 2024-06-06 PROCEDURE — 97140 MANUAL THERAPY 1/> REGIONS: CPT

## 2024-06-06 PROCEDURE — 97110 THERAPEUTIC EXERCISES: CPT

## 2024-06-06 NOTE — PROGRESS NOTES
Physical Therapy  Daily Treatment Note  Date: 2024  Patient Name: Paco Matrinez  MRN: [id number]     :   1950    Referring Physician: Dahlia Eastman APRN - * Jaren   PCP: Cuca Hannah MD    Medical Diagnosis: Lumbar back pain [M54.50]    Treatment Diagnosis: Lumbar pain      Insurance: Payor: [facility] MEDICARE / Plan: [facility] MEDICARE ADVANTAGE / Product Type: *No Product type* /   Insurance ID: [id number] - (Medicare Managed)    Subjective:   General  Referring Provider (secondary): Jaren  PT Visit Information  Onset Date:  ()  Total # of Visits Approved:  ()  Total # of Visits to Date: 16  Plan of Care/Certification Expiration Date: 24  No Show: 0  Canceled Appointment: 0  Referring Provider (secondary): Jaren  Subjective  Subjective: Pt. states pain 3/10 today R glute worse this morning with some sciatic when he first woke up.  Stretched twice and it helped pain to go away.  Pt. states piriformis stretch really helping.  Pain Screening  Patient Currently in Pain: No       Treatment Activities:  Exercises:      Treatment Reasoning    Exercise 1: *SKTC x 3 H30  Exercise 2: *Piriformis x 2 H30  Exercise 4: scissor bridge GTB x 10 VC for form  Exercise 5: bridge 2x 10 hold 3 sec  Exercise 6: supine hip flexor stretch H 30 sec x 3 R  Exercise 8: butterfly stretch x 2 hold 60 sec  Exercise 10: supported standing hip ABD x 20 hold 3 sec B  Exercise 11: supported standing hip Ext x 20 hold 3 sec  Exercise 12: supported standing march x 20 hold 3 sec                          Manual Therapy: (CPT 46107) Treatment Reasoning     Joint Mobilization: PA mobs lumbar spine gr II-III for pain reduction and to improve joint mobility, Inferior L hip mobs to dec tightness and pain  Soft Tissue Mobilizaton: STM/MFR to sacral lumbar region L.R  to dec tension and pain                       Assessment:   Conditions Requiring Skilled Therapeutic Intervention  Body Structures, Functions, Activity Limitations

## 2024-06-11 ENCOUNTER — HOSPITAL ENCOUNTER (OUTPATIENT)
Dept: PHYSICAL THERAPY | Age: 74
Setting detail: THERAPIES SERIES
Discharge: HOME | End: 2024-06-11
Payer: MEDICARE

## 2024-06-11 PROCEDURE — 97140 MANUAL THERAPY 1/> REGIONS: CPT

## 2024-06-11 PROCEDURE — 97110 THERAPEUTIC EXERCISES: CPT

## 2024-06-11 ASSESSMENT — PAIN DESCRIPTION - LOCATION: LOCATION: BUTTOCKS;BACK

## 2024-06-11 ASSESSMENT — PAIN DESCRIPTION - DESCRIPTORS: DESCRIPTORS: TIGHTNESS

## 2024-06-11 ASSESSMENT — PAIN DESCRIPTION - ORIENTATION: ORIENTATION: LEFT

## 2024-06-11 ASSESSMENT — PAIN SCALES - GENERAL: PAINLEVEL_OUTOF10: 3

## 2024-06-11 ASSESSMENT — PAIN DESCRIPTION - PAIN TYPE: TYPE: CHRONIC PAIN

## 2024-06-11 NOTE — PROGRESS NOTES
Physical Therapy: Daily Note   Patient: Paco Martinez (74 y.o. male)   Examination Date: 2024  Plan of Care/Certification Expiration Date: 24    No data recorded   :  1950 # of Visits since SOC:   Visit count could not be calculated. Make sure you are using a visit which is associated with an episode.   MRN: [id number]  CSN: [id number] Start of Care Date:   No linked episodes   Insurance: Payor: [facility] MEDICARE / Plan: [facility] MEDICARE ADVANTAGE / Product Type: *No Product type* /   Insurance ID: [id number] - (Medicare Managed) Secondary Insurance (if applicable):    Referring Physician: Dahlia Eastman APRN - NP Gross   PCP: Cuca Hannah MD Visits to Date/Visits Approved:     No Show/Cancelled Appts: 0 / 0     Medical Diagnosis: Lumbar back pain [M54.50]    Treatment Diagnosis: Lumbar pain        SUBJECTIVE EXAMINATION   Pain Level: Pain Screening  Patient Currently in Pain: Yes  Pain Assessment: 0-10  Pain Level: 3  Best Pain Level: 0  Worst Pain Level: 6  Post Treatment Pain Level: 0  Pain Type: Chronic pain  Pain Location: Buttocks, Back  Pain Orientation: Left  Pain Descriptors: Tightness    Patient Comments: Subjective: Pt with 3/10 in the left glut and PSIS region today. He was stiff and more sore when he first woke up, but performed his HEP and pain did decrease and able to move with greater ease.    HEP Compliance: Good        OBJECTIVE EXAMINATION   Restrictions:  No data recorded No data recorded No data recorded              TREATMENT     Exercises:      Treatment Reasoning    Exercise 1: *SKTC x 3 H30  Exercise 2: *Piriformis x 2 H30  Exercise 3: prone hip ext alt x 10 hold 3 sec  Exercise 4: scissor bridge GTB x 10 VC for form  Exercise 5: bridge 2x 10 hold 3 sec  Exercise 6: supine hip flexor stretch H 30 sec x 3 R  Exercise 7: SLR with abd bracing 2x 10 vcs for correct form H3''  Exercise 8: butterfly stretch x 2 hold 60 sec  Exercise 9: LTR 30 sec x 3  Exercise 10: supported

## 2024-06-14 ENCOUNTER — HOSPITAL ENCOUNTER (OUTPATIENT)
Dept: PHYSICAL THERAPY | Age: 74
Setting detail: THERAPIES SERIES
Discharge: HOME | End: 2024-06-14
Payer: MEDICARE

## 2024-06-14 PROCEDURE — 97530 THERAPEUTIC ACTIVITIES: CPT

## 2024-06-14 PROCEDURE — 97110 THERAPEUTIC EXERCISES: CPT

## 2024-06-14 ASSESSMENT — PAIN SCALES - GENERAL: PAINLEVEL_OUTOF10: 1

## 2024-06-14 NOTE — PROGRESS NOTES
10 exercises)                           Gait: (CPT 09358)  Treatment Reasoning    Gait Training 1: Pt. ambulated 440' with carrying his cane with decreased stance time L LE with tightness post gait (no back pain)                     Pt Education: Additional Comments: Dc to HEP       ASSESSMENT     Assessment: Assessment: Pt arrives to his discharge visit wtihn 0-1/10 pain today. Pt reports that since starting therapy that he is feeling much better and was even able to run a short distance yesterday. PT completed discharge measurements and then reviewed pts entire HEP and made a copy of 10 exercises and gave pt GTB as well as information on our Wellness Program. Pt in now DC'd to HEP.  Body Structures, Functions, Activity Limitations Requiring Skilled Therapeutic Intervention: Decreased functional mobility , Decreased endurance, Increased pain    Post-Treatment Pain Level:  same     Activity Tolerance: Patient tolerated treatment well    Therapy Prognosis: Good       GOALS   Patient Goals : To help to decrease his back pain so that he can be more active  Short Term Goals Completed by 1-2 weeks from date of evaluation Current Status Goal Status   Pt reports having 3/10  or less back pain with increased activity Pt reports having 0-1/10 but up to 10/10 at times Partially met, In progress   Pt reports able to complete his HEP 3-5x per week Pt reported HEP- at least a few stretches almost daily Met                                                                       Long Term Goals Completed by 4-6 weeks from date of evaluation Current Status Goal Status   Pt reports having 1-2/10 back pain for 75% of his day or better so that pt can be more active 75% of his day 0-4 most of his day In progress   Increase pts B LE and core strength to 4+/5 or better so that pt can be on his feet for longer periods of time Pt is improving in all areas of his strength; most week at right DF In progress   Pt able to ambulate with his st

## 2024-06-22 DIAGNOSIS — I10 BENIGN ESSENTIAL HYPERTENSION: ICD-10-CM

## 2024-06-24 RX ORDER — BISOPROLOL FUMARATE AND HYDROCHLOROTHIAZIDE 10; 6.25 MG/1; MG/1
1 TABLET ORAL DAILY
Qty: 90 TABLET | Refills: 3 | Status: SHIPPED | OUTPATIENT
Start: 2024-06-24

## 2024-08-11 ENCOUNTER — HOSPITAL ENCOUNTER (EMERGENCY)
Age: 74
Discharge: HOME | End: 2024-08-11
Payer: MEDICARE

## 2024-08-11 VITALS
SYSTOLIC BLOOD PRESSURE: 159 MMHG | HEIGHT: 61 IN | DIASTOLIC BLOOD PRESSURE: 84 MMHG | TEMPERATURE: 98.3 F | OXYGEN SATURATION: 95 % | HEART RATE: 68 BPM | WEIGHT: 195 LBS | BODY MASS INDEX: 36.82 KG/M2 | RESPIRATION RATE: 16 BRPM
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
## 2024-08-11 PROCEDURE — 6370000000 HC RX 637 (ALT 250 FOR IP)

## 2024-08-11 PROCEDURE — 99283 EMERGENCY DEPT VISIT LOW MDM: CPT

## 2024-08-11 RX ORDER — DOXYCYCLINE HYCLATE 100 MG/1
100 CAPSULE ORAL ONCE
Status: COMPLETED | OUTPATIENT
Start: 2024-08-11 | End: 2024-08-11

## 2024-08-11 RX ORDER — GINSENG 100 MG
CAPSULE ORAL ONCE
Status: COMPLETED | OUTPATIENT
Start: 2024-08-11 | End: 2024-08-11

## 2024-08-11 RX ORDER — DOXYCYCLINE HYCLATE 100 MG
100 TABLET ORAL 2 TIMES DAILY
Qty: 20 TABLET | Refills: 0 | Status: SHIPPED | OUTPATIENT
Start: 2024-08-11 | End: 2024-08-21

## 2024-08-11 RX ADMIN — BACITRACIN: 500 OINTMENT TOPICAL at 16:54

## 2024-08-11 RX ADMIN — DOXYCYCLINE HYCLATE 100 MG: 100 CAPSULE ORAL at 16:54

## 2024-08-11 NOTE — ED PROVIDER NOTES
well-developed. He is not ill-appearing, toxic-appearing or diaphoretic.   HENT:      Head: Normocephalic and atraumatic.      Mouth/Throat:      Mouth: Mucous membranes are moist.      Pharynx: No oropharyngeal exudate or posterior oropharyngeal erythema.   Eyes:      Conjunctiva/sclera: Conjunctivae normal.      Pupils: Pupils are equal, round, and reactive to light.   Neck:        Comments: Engorged tick removed had intact from right side of neck.  Slight erythema no rash to the area.  No enlarged lymph nodes.  Cardiovascular:      Rate and Rhythm: Normal rate and regular rhythm.      Pulses: Normal pulses.      Heart sounds: Normal heart sounds. No murmur heard.     No friction rub.   Pulmonary:      Effort: Pulmonary effort is normal.      Breath sounds: Normal breath sounds. No wheezing or rhonchi.   Abdominal:      General: Bowel sounds are normal. There is no distension.      Palpations: Abdomen is soft.      Tenderness: There is no abdominal tenderness. There is no guarding.   Musculoskeletal:         General: Normal range of motion.      Cervical back: Normal range of motion and neck supple. No rigidity or crepitus. No pain with movement, spinous process tenderness or muscular tenderness.   Skin:     General: Skin is warm and dry.      Capillary Refill: Capillary refill takes less than 2 seconds.      Findings: No rash.   Neurological:      General: No focal deficit present.      Mental Status: He is alert and oriented to person, place, and time. Mental status is at baseline.   Psychiatric:         Mood and Affect: Mood normal.         Behavior: Behavior normal.         Thought Content: Thought content normal.         Judgment: Judgment normal.           DIAGNOSTIC RESULTS     RADIOLOGY:   Non-plain film images such as CT, Ultrasound and MRI are read by the radiologist. Plain radiographic images are visualized and preliminarilyinterpreted by MERLE Hammond - CANDI with the below

## 2024-08-11 NOTE — DISCHARGE INSTRUCTIONS
Please apply antibiotic ointment daily with clean bandage.  Follow-up with PCP for wound check.  Return to emergency department for any new or worsening symptoms.

## 2024-08-11 NOTE — PROGRESS NOTES
Discharge medication and instructions reviewed with patient. Pt verbalized understanding and stated no further questions at this time. Pt left the ED in stable condition and ambulated steadily with cane.

## 2024-08-21 DIAGNOSIS — I25.10 CORONARY ARTERY DISEASE WITHOUT ANGINA PECTORIS, UNSPECIFIED VESSEL OR LESION TYPE, UNSPECIFIED WHETHER NATIVE OR TRANSPLANTED HEART: ICD-10-CM

## 2024-08-21 DIAGNOSIS — E78.2 MIXED HYPERLIPIDEMIA: ICD-10-CM

## 2024-08-21 RX ORDER — ATORVASTATIN CALCIUM 40 MG/1
40 TABLET, FILM COATED ORAL NIGHTLY
Qty: 90 TABLET | Refills: 3 | Status: SHIPPED | OUTPATIENT
Start: 2024-08-21

## 2024-08-21 NOTE — TELEPHONE ENCOUNTER
Received request for prescription refills for patient.   Patient follows with Dr. Lanier     Request is for Atorvastatin 40mg QD  Is patient currently on medication yes    Last OV 11/29/23  Next OV 11/21/24    Pended for signing and sent to provider

## 2024-11-21 ENCOUNTER — APPOINTMENT (OUTPATIENT)
Dept: CARDIOLOGY | Facility: CLINIC | Age: 74
End: 2024-11-21
Payer: MEDICARE

## 2024-12-02 DIAGNOSIS — Z89.512: ICD-10-CM

## 2025-01-15 ENCOUNTER — DOCUMENTATION (OUTPATIENT)
Dept: ORTHOPEDIC SURGERY | Facility: CLINIC | Age: 75
End: 2025-01-15
Payer: MEDICARE

## 2025-01-15 NOTE — PROGRESS NOTES
To whom it may concern:    Ralf Jones is a pleasant 74-year-old male with a high below-knee amputation.  He has remained extremely active.  He is currently functioning as a K2 ambulator due to the looseness of his current prosthesis..  He does have some atrophy in the leg and gets intermittent looseness in his prosthesis.    I agree with the prosthesis recommendation of a new prosthesis and hope to maintain his K3 level of function once his prosthesis is replaced.  He has no comorbid conditions that would affect his ability to walk or use a prosthesis.    Previous x-rays showed stable alignment of his BKA stump.    At this point we will prescribe him a new prosthesis and we can see him back as needed.

## 2025-02-26 ENCOUNTER — APPOINTMENT (OUTPATIENT)
Dept: CARDIOLOGY | Facility: CLINIC | Age: 75
End: 2025-02-26
Payer: MEDICARE

## 2025-02-26 VITALS
HEART RATE: 68 BPM | SYSTOLIC BLOOD PRESSURE: 144 MMHG | BODY MASS INDEX: 35.42 KG/M2 | WEIGHT: 187.6 LBS | DIASTOLIC BLOOD PRESSURE: 82 MMHG | HEIGHT: 61 IN

## 2025-02-26 DIAGNOSIS — G47.33 OBSTRUCTIVE SLEEP APNEA: ICD-10-CM

## 2025-02-26 DIAGNOSIS — I10 BENIGN ESSENTIAL HYPERTENSION: ICD-10-CM

## 2025-02-26 DIAGNOSIS — E78.2 MIXED HYPERLIPIDEMIA: ICD-10-CM

## 2025-02-26 DIAGNOSIS — I87.2 CHRONIC VENOUS INSUFFICIENCY OF LOWER EXTREMITY: ICD-10-CM

## 2025-02-26 DIAGNOSIS — Z89.512 LEFT BELOW-KNEE AMPUTEE (MULTI): ICD-10-CM

## 2025-02-26 DIAGNOSIS — I25.10 CORONARY ARTERY DISEASE INVOLVING NATIVE CORONARY ARTERY OF NATIVE HEART WITHOUT ANGINA PECTORIS: ICD-10-CM

## 2025-02-26 PROCEDURE — 3008F BODY MASS INDEX DOCD: CPT | Performed by: INTERNAL MEDICINE

## 2025-02-26 PROCEDURE — 1159F MED LIST DOCD IN RCRD: CPT | Performed by: INTERNAL MEDICINE

## 2025-02-26 PROCEDURE — 3079F DIAST BP 80-89 MM HG: CPT | Performed by: INTERNAL MEDICINE

## 2025-02-26 PROCEDURE — 1036F TOBACCO NON-USER: CPT | Performed by: INTERNAL MEDICINE

## 2025-02-26 PROCEDURE — 3077F SYST BP >= 140 MM HG: CPT | Performed by: INTERNAL MEDICINE

## 2025-02-26 PROCEDURE — 1157F ADVNC CARE PLAN IN RCRD: CPT | Performed by: INTERNAL MEDICINE

## 2025-02-26 PROCEDURE — 99214 OFFICE O/P EST MOD 30 MIN: CPT | Performed by: INTERNAL MEDICINE

## 2025-02-26 RX ORDER — BISMUTH SUBSALICYLATE 262 MG
1 TABLET,CHEWABLE ORAL DAILY
COMMUNITY

## 2025-02-26 RX ORDER — LISINOPRIL 20 MG/1
20 TABLET ORAL 2 TIMES DAILY
Qty: 180 TABLET | Refills: 3 | Status: SHIPPED | OUTPATIENT
Start: 2025-02-26

## 2025-02-26 RX ORDER — NITROGLYCERIN 0.4 MG/1
0.4 TABLET SUBLINGUAL EVERY 5 MIN PRN
Qty: 25 TABLET | Refills: 5 | Status: SHIPPED | OUTPATIENT
Start: 2025-02-26

## 2025-02-26 NOTE — PROGRESS NOTES
CARDIOLOGY OFFICE VISIT      CHIEF COMPLAINT      HISTORY OF PRESENT ILLNESS  The patient states that he is feeling well from a cardiac standpoint.  He denies chest discomfort or symptoms of myocardial ischemia.  He has not used any nitroglycerin.  He denies palpitations and syncope.  He denies any problem with his medication.  I did go over his lipid profile from 9 months ago with him.  Cholesterol 116, atorvastatin 72, HDL 40, LDL 62.      IMPRESSION:   1. Coronary artery disease  2. Essential hypertension.  3. Mixed hyperlipidemia.  4. Former smoker.  5. Obesity.   6. Remote left below-the-knee amputation.  7. History of obstructive sleep apnea.  8. Chronic venous insufficiency of right lower extremity.     Please excuse any errors in grammar or translation related to this dictation. Voice recognition software was utilized to prepare this document.             Past Medical History  Past Medical History:   Diagnosis Date    Anesthesia of skin 10/04/2021    Numbness and tingling    Coronary artery disease     Hyperlipidemia     Hypertension     Personal history of other diseases of the circulatory system 10/04/2021    History of hypertension    Personal history of other diseases of the musculoskeletal system and connective tissue 10/04/2021    History of arthritis    Personal history of other endocrine, nutritional and metabolic disease     History of high cholesterol    Personal history of other mental and behavioral disorders     History of anxiety    Sleep apnea     Unspecified visual loss 10/04/2021    Vision problems       Social History  Social History     Tobacco Use    Smoking status: Former     Current packs/day: 0.00     Types: Cigarettes     Quit date:      Years since quittin.    Smokeless tobacco: Never   Substance Use Topics    Alcohol use: Yes     Comment: once a week    Drug use: Never       Family History     Family History   Problem Relation Name Age of Onset    Bilateral breast cancer  Mother      Other (biliary tract cancer [Other]) Mother      Other (Coronary artery stent placement) Mother      Other (cabg) Father      Other (cardiac disorder) Father      Skin cancer Father          Allergies:  No Known Allergies     Outpatient Medications:  Current Outpatient Medications   Medication Instructions    aspirin 81 mg, Daily    atorvastatin (LIPITOR) 40 mg, oral, Nightly    bisoproloL-hydrochlorothiazide (Ziac) 10-6.25 mg tablet 1 tablet, oral, Daily    hydrOXYzine HCL (ATARAX) 25 mg    lisinopril 20 mg, oral, 2 times daily    multivitamin tablet 1 tablet, Daily    nitroglycerin (NITROSTAT) 0.4 mg    tamsulosin (FLOMAX) 0.4 mg, Daily    traMADol (ULTRAM) 50 mg, Every 8 hours PRN          REVIEW OF SYSTEMS  Review of Systems   All other systems reviewed and are negative.        VITALS  Vitals:    02/26/25 1429   BP: 144/82   Pulse: 68       PHYSICAL EXAM  Constitutional:       Appearance: Healthy appearance. Not in distress.   Eyes:      Conjunctiva/sclera: Conjunctivae normal.      Pupils: Pupils are equal, round, and reactive to light.   Neck:      Vascular: No JVR. JVD normal.   Pulmonary:      Effort: Pulmonary effort is normal.      Breath sounds: Normal breath sounds. No wheezing. No rhonchi. No rales.   Chest:      Chest wall: Not tender to palpatation.   Cardiovascular:      PMI at left midclavicular line. Normal rate. Regular rhythm. Normal S1. Normal S2.       Murmurs: There is no murmur.      No gallop.  No click. No rub.   Pulses:     Intact distal pulses.   Edema:     Peripheral edema absent.   Abdominal:      Tenderness: There is no abdominal tenderness.   Musculoskeletal: Normal range of motion.         General: No tenderness.      Cervical back: Normal range of motion. Skin:     General: Skin is warm and dry.   Neurological:      General: No focal deficit present.      Mental Status: Alert and oriented to person, place and time.           ASSESSMENT AND PLAN  Diagnoses and all orders  for this visit:  Coronary artery disease involving native coronary artery of native heart without angina pectoris  Benign essential hypertension  Mixed hyperlipidemia  Left below-knee amputee (Multi)  Obstructive sleep apnea  Chronic venous insufficiency of lower extremity  BMI 35.0-35.9,adult      [unfilled]

## 2025-02-26 NOTE — PATIENT INSTRUCTIONS
Follow up office visit in 1 year.  Continue same medications/treatment.  Patient educated on proper medication use.  Patient educated on risk factor modification.  Please bring any lab results from other providers / physicians to your next appointment.    Please bring all medicines, vitamins and herbal supplements with you when you come to the office.    Prescriptions will not be filled unless you are compliant with your follow up appointments or have a follow up  appointment scheduled as per instruction of your physician.  Refills should be requested at the time of  Your visit.    Miracle BARON LPN, am scribing for and in the presence of Dr. Naveed Lanier MD, FACC

## 2025-06-20 DIAGNOSIS — I10 BENIGN ESSENTIAL HYPERTENSION: ICD-10-CM

## 2025-06-20 RX ORDER — BISOPROLOL FUMARATE AND HYDROCHLOROTHIAZIDE 10; 6.25 MG/1; MG/1
1 TABLET ORAL DAILY
Qty: 90 TABLET | Refills: 3 | Status: SHIPPED | OUTPATIENT
Start: 2025-06-20 | End: 2026-06-20

## 2025-06-20 NOTE — TELEPHONE ENCOUNTER
Received request for prescription refills for patient.   Patient follows with Dr. Lanier    Request is for bisoprolol hydrochlorothiazide   Is patient currently on medication yes    Last OV 2/26/2025  Next OV 2/25/2026    Pended for signing and sent to provider

## 2025-08-22 DIAGNOSIS — I25.10 CORONARY ARTERY DISEASE WITHOUT ANGINA PECTORIS, UNSPECIFIED VESSEL OR LESION TYPE, UNSPECIFIED WHETHER NATIVE OR TRANSPLANTED HEART: ICD-10-CM

## 2025-08-22 DIAGNOSIS — E78.2 MIXED HYPERLIPIDEMIA: ICD-10-CM

## 2025-08-22 RX ORDER — ATORVASTATIN CALCIUM 40 MG/1
40 TABLET, FILM COATED ORAL NIGHTLY
Qty: 90 TABLET | Refills: 3 | Status: SHIPPED | OUTPATIENT
Start: 2025-08-22

## 2026-02-25 ENCOUNTER — APPOINTMENT (OUTPATIENT)
Dept: CARDIOLOGY | Facility: CLINIC | Age: 76
End: 2026-02-25
Payer: MEDICARE